# Patient Record
Sex: MALE | Race: WHITE | URBAN - METROPOLITAN AREA
[De-identification: names, ages, dates, MRNs, and addresses within clinical notes are randomized per-mention and may not be internally consistent; named-entity substitution may affect disease eponyms.]

---

## 2017-05-11 ENCOUNTER — EMPLOYEE WELLNESS (OUTPATIENT)
Dept: OTHER | Age: 47
End: 2017-05-11

## 2017-05-11 LAB
CHOLESTEROL, TOTAL: 204 MG/DL (ref 0–199)
GLUCOSE BLD-MCNC: 90 MG/DL (ref 74–107)
HDLC SERPL-MCNC: 44 MG/DL
LDL CHOLESTEROL CALCULATED: 122 MG/DL (ref 0–99)
TRIGL SERPL-MCNC: 192 MG/DL (ref 0–149)

## 2018-03-20 VITALS — WEIGHT: 192 LBS

## 2018-05-18 ENCOUNTER — EMPLOYEE WELLNESS (OUTPATIENT)
Dept: OTHER | Age: 48
End: 2018-05-18

## 2018-05-19 LAB
CHOLESTEROL, TOTAL: 199 MG/DL (ref 0–199)
GLUCOSE BLD-MCNC: 87 MG/DL (ref 74–107)
HDLC SERPL-MCNC: 37 MG/DL
LDL CHOLESTEROL CALCULATED: 112 MG/DL (ref 0–99)
TRIGL SERPL-MCNC: 248 MG/DL (ref 0–149)

## 2018-05-29 VITALS — WEIGHT: 194 LBS

## 2019-04-24 ENCOUNTER — EMPLOYEE WELLNESS (OUTPATIENT)
Dept: OTHER | Age: 49
End: 2019-04-24

## 2019-04-24 LAB
CHOLESTEROL, TOTAL: 193 MG/DL (ref 0–199)
GLUCOSE BLD-MCNC: 89 MG/DL (ref 74–107)
HDLC SERPL-MCNC: 38 MG/DL
LDL CHOLESTEROL CALCULATED: 123 MG/DL (ref 0–99)
TRIGL SERPL-MCNC: 159 MG/DL (ref 0–149)

## 2019-04-29 VITALS — WEIGHT: 193 LBS

## 2020-03-26 ENCOUNTER — HOSPITAL ENCOUNTER (OUTPATIENT)
Age: 50
Discharge: HOME | End: 2020-03-28
Payer: COMMERCIAL

## 2020-03-26 ENCOUNTER — OFFICE VISIT (OUTPATIENT)
Dept: PRIMARY CARE CLINIC | Age: 50
End: 2020-03-26
Payer: COMMERCIAL

## 2020-03-26 VITALS
RESPIRATION RATE: 20 BRPM | SYSTOLIC BLOOD PRESSURE: 136 MMHG | WEIGHT: 190 LBS | OXYGEN SATURATION: 97 % | DIASTOLIC BLOOD PRESSURE: 80 MMHG | HEART RATE: 96 BPM | HEIGHT: 70 IN | TEMPERATURE: 98.6 F | BODY MASS INDEX: 27.2 KG/M2

## 2020-03-26 LAB
INFLUENZA A ANTIBODY: NORMAL
INFLUENZA B ANTIBODY: NORMAL

## 2020-03-26 PROCEDURE — 87804 INFLUENZA ASSAY W/OPTIC: CPT | Performed by: PHYSICIAN ASSISTANT

## 2020-03-26 PROCEDURE — 99213 OFFICE O/P EST LOW 20 MIN: CPT | Performed by: PHYSICIAN ASSISTANT

## 2020-03-26 PROCEDURE — U0002 COVID-19 LAB TEST NON-CDC: HCPCS

## 2020-03-26 NOTE — PROGRESS NOTES
dry.      Findings: No rash. Neurological:      Mental Status: He is alert and oriented to person, place, and time. Deep Tendon Reflexes: Reflexes are normal and symmetric. Psychiatric:         Speech: Speech normal.         Behavior: Behavior normal.              Assessment/Plan:  1. Exposure to 2019 novel coronavirus  - COVID-19; Future    2. Flu-like symptoms  - POCT Influenza A/B           Clearance JUMA Ng  3/26/20     This visit was provided as a focused evaluation during the COVID -19 pandemic/national emergency. A comprehensive review of all previous patient history and testing was not conducted. Pertinent findings were elicited during the visit.

## 2020-03-26 NOTE — PATIENT INSTRUCTIONS
petting, snuggling, being kissed or licked, and sharing food. If you must care for your pet or be around animals while you are sick, wash your hands before and after you interact with pets and wear a facemask. Call ahead before visiting your doctor  If you have a medical appointment, call the healthcare provider and tell them that you have or may have COVID-19. This will help the healthcare providers office take steps to keep other people from getting infected or exposed. Wear a facemask  You should wear a facemask when you are around other people (e.g., sharing a room or vehicle) or pets and before you enter a healthcare providers office. If you are not able to wear a facemask (for example, because it causes trouble breathing), then people who live with you should not stay in the same room with you, or they should wear a facemask if they enter your room. Cover your coughs and sneezes  Cover your mouth and nose with a tissue when you cough or sneeze. Throw used tissues in a lined trash can. Immediately wash your hands with soap and water for at least 20 seconds or, if soap and water are not available, clean your hands with an alcohol-based hand  that contains at least 60% alcohol. Clean your hands often  Wash your hands often with soap and water for at least 20 seconds, especially after blowing your nose, coughing, or sneezing; going to the bathroom; and before eating or preparing food. If soap and water are not readily available, use an alcohol-based hand  with at least 60% alcohol, covering all surfaces of your hands and rubbing them together until they feel dry. Soap and water are the best option if hands are visibly dirty. Avoid touching your eyes, nose, and mouth with unwashed hands. Avoid sharing personal household items  You should not share dishes, drinking glasses, cups, eating utensils, towels, or bedding with other people or pets in your home.  After using these items, they should departments.

## 2020-03-27 ENCOUNTER — TELEPHONE (OUTPATIENT)
Dept: ADMINISTRATIVE | Age: 50
End: 2020-03-27

## 2020-03-30 LAB
REPORT: ABNORMAL
SARS-COV-2: DETECTED
THIS TEST SENT TO: ABNORMAL

## 2020-04-17 ENCOUNTER — HOSPITAL ENCOUNTER (OUTPATIENT)
Age: 50
Discharge: HOME | End: 2020-04-19
Payer: COMMERCIAL

## 2020-04-17 ENCOUNTER — OFFICE VISIT (OUTPATIENT)
Dept: PRIMARY CARE CLINIC | Age: 50
End: 2020-04-17
Payer: COMMERCIAL

## 2020-04-17 VITALS
TEMPERATURE: 98 F | RESPIRATION RATE: 16 BRPM | HEART RATE: 93 BPM | SYSTOLIC BLOOD PRESSURE: 106 MMHG | OXYGEN SATURATION: 98 % | DIASTOLIC BLOOD PRESSURE: 80 MMHG

## 2020-04-17 LAB — SARS-COV-2, NAAT: NOT DETECTED

## 2020-04-17 PROCEDURE — U0002 COVID-19 LAB TEST NON-CDC: HCPCS

## 2020-04-17 PROCEDURE — 99213 OFFICE O/P EST LOW 20 MIN: CPT | Performed by: PHYSICIAN ASSISTANT

## 2020-04-17 NOTE — PROGRESS NOTES
Chief Complaint   Covid Testing (Retest, + 3/26) and Fatigue    History of Present Illness   Source of history provided by: patient. Kwasi Meraz is a 51 y.o. old male who has a past medical history of: History reviewed. No pertinent past medical history. Presents to the flu clinic for recheck after testing positive for COVID-19 on 3/26. He is a [employer] and needs two consecutive negative swabs in order to return to work. Pt reports ongoing fatigue but denies any additional symptoms. Denies any cough, SOB, fever, CP, abdominal pain, vomiting, or rash. Pt did not take any fever reducers prior to arrival. Denies any hx of asthma, COPD, or tobacco use. ROS   Pertinent positives and negatives are stated within HPI, all other systems reviewed and are negative. History reviewed. No pertinent surgical history. Social History:  reports that he has never smoked. He has never used smokeless tobacco.  Family History: family history is not on file. Allergies: Patient has no known allergies. Physical Exam      VS:  /80   Pulse 93   Temp 98 °F (36.7 °C) (Oral)   Resp 16   SpO2 98%    Oxygen Saturation Interpretation: Normal.    Constitutional:  Alert, development consistent with age. NAD. Neck:  Normal ROM. Supple. No anterior cervical adenopathy noted. Lungs: CTAB without wheezes, rales, or rhonchi. CV:  Regular rate and rhythm, normal heart sounds, without pathological murmurs, ectopy, gallops, or rubs. Skin:  Normal turgor. Warm, dry, without visible rash. Lymphatic: No lymphangitis or adenopathy noted. Neurological:  Oriented. Motor functions intact. Lab / Imaging Results   (All laboratory and radiology results have been personally reviewed by myself)  Labs:  No results found for this visit on 04/17/20. Imaging: All Radiology results interpreted by Radiologist unless otherwise noted.   No orders to display       Medical Decision Making   Pt non-toxic and stable at time of

## 2020-04-18 ENCOUNTER — NURSE ONLY (OUTPATIENT)
Dept: PRIMARY CARE CLINIC | Age: 50
End: 2020-04-18

## 2020-04-18 ENCOUNTER — HOSPITAL ENCOUNTER (OUTPATIENT)
Age: 50
Discharge: HOME | End: 2020-04-20
Payer: COMMERCIAL

## 2020-04-18 LAB — SARS-COV-2, NAAT: NOT DETECTED

## 2020-04-18 PROCEDURE — U0002 COVID-19 LAB TEST NON-CDC: HCPCS

## 2021-06-22 LAB
CHOLESTEROL, TOTAL: 195 MG/DL (ref 0–199)
GLUCOSE BLD-MCNC: 89 MG/DL (ref 74–107)
HDLC SERPL-MCNC: 36 MG/DL
LDL CHOLESTEROL CALCULATED: 118 MG/DL (ref 0–99)
TRIGL SERPL-MCNC: 207 MG/DL (ref 0–149)

## 2022-05-11 ENCOUNTER — OFFICE VISIT (OUTPATIENT)
Dept: FAMILY MEDICINE CLINIC | Age: 52
End: 2022-05-11
Payer: COMMERCIAL

## 2022-05-11 VITALS
BODY MASS INDEX: 27.06 KG/M2 | HEART RATE: 76 BPM | OXYGEN SATURATION: 98 % | HEIGHT: 70 IN | WEIGHT: 189 LBS | RESPIRATION RATE: 18 BRPM | SYSTOLIC BLOOD PRESSURE: 142 MMHG | DIASTOLIC BLOOD PRESSURE: 90 MMHG | TEMPERATURE: 98.2 F

## 2022-05-11 DIAGNOSIS — M25.561 ACUTE PAIN OF RIGHT KNEE: ICD-10-CM

## 2022-05-11 DIAGNOSIS — M25.562 ACUTE PAIN OF LEFT KNEE: Primary | ICD-10-CM

## 2022-05-11 PROCEDURE — 99214 OFFICE O/P EST MOD 30 MIN: CPT | Performed by: NURSE PRACTITIONER

## 2022-05-11 RX ORDER — METHYLPREDNISOLONE 4 MG/1
TABLET ORAL
Qty: 1 KIT | Refills: 0 | Status: SHIPPED | OUTPATIENT
Start: 2022-05-11

## 2022-05-11 RX ORDER — FLUVOXAMINE MALEATE 100 MG/1
CAPSULE, EXTENDED RELEASE ORAL
COMMUNITY
Start: 2022-04-25

## 2022-05-11 NOTE — PROGRESS NOTES
Chief Complaint:   Knee Pain (Both)    History of Present Illness   Source of history provided by:  patient. Cristopher Moran is a 49 y.o. old male who presents to the walk-in for bilateral knee pain for the past week. States the pain is located over the anterior and distal aspect and does not radiate. States the pain is progressive. Denies any known injury to the knee, however, reports he started bicycling and jumping rope recently. Reports associated swelling and moderate pain with ROM. Pain is also exacerbated by ambulation. Denies any weakness, paresthesias, calf pain/edema, foot/ankle pain, hip pain, back pain, abrasions, fever, chills, rash, or any other symptoms. There has not been a history or prior knee problems. Denies any history of previous knee surgery. Has been taking Tylenol and Motrin OTC without relief. Review of Systems    Unless otherwise stated in this report or unable to obtain because of the patient's clinical or mental status as evidenced by the medical record, this patients's positive and negative responses for Review of Systems, constitutional, psych, eyes, ENT, cardiovascular, respiratory, gastrointestinal, neurological, genitourinary, musculoskeletal, integument systems and systems related to the presenting problem are either stated in the preceding or were negative for the symptoms and/or complaints related to the medical problem.waldemar. Past Medical History:  has no past medical history on file. Past Surgical History:  has no past surgical history on file. Social History:  reports that he has never smoked. He has never used smokeless tobacco.  Family History: family history is not on file. Allergies: Patient has no known allergies.     Physical Exam   Vital Signs:  BP (!) 142/90 (Site: Left Upper Arm, Position: Sitting, Cuff Size: Medium Adult)   Pulse 76   Temp 98.2 °F (36.8 °C) (Temporal)   Resp 18   Ht 5' 10\" (1.778 m)   Wt 189 lb (85.7 kg)   SpO2 98%   BMI 27.12 kg/m²  Oxygen Saturation Interpretation: Normal.    Constitutional:  Alert, development consistent with age. Lungs: CTAB without wheezing, rales, or rhonchi. CV: RRR without pathologic murmurs or gallops. Knee:  bilateral               Inspection: Bilateral knee without obvious deformity. Tenderness:  Mild TTP over distal anterior. Swelling/Effusion: No edema noted. Deformity: No obvious deformity. ROM: ROM 0º-120º with mild to moderate discomfort. Skin:  No bruising noted. No abrasions, rashes, or erythema noted. Drawer's:  Negativ anterior/posterior drawer sign. Baljeet's: Negative Baljeet's sign. Valgus/Varus Stress: Negative Varus/Valgus stress sign. Crepitus: No crepitus noted with flexion and extension. Hip: bilateral            Tenderness:  No TTP.              ROM: FROM hip without pain. Joint(s) Below: Bilateral calf/ankle/foot                Tenderness:  No TTP over calf, ankle, or foot without any edema. ROM: FROM without pain or deficits. Neurovascular:             Sensory deficit: Sensation intact above and below the injury site. Pulse deficit: Pulses 2+ and bounding. Capillary refill: Less then 2 sec throughout. Gait:  Slightly antalgic gait, but able to bear weight with mild difficulty. Lymphatics: No lymphangitis or adenopathy noted. Neurological:  Alert and oriented. Motor functions intact. Test Results Section   (All laboratory and radiology results have been personally reviewed by myself)  Labs:    Imaging: All Radiology results interpreted by Radiologist unless otherwise noted. No results found. Assessment / Plan   Impression(s):  Suma Gonzáles was seen today for knee pain. Diagnoses and all orders for this visit:    Acute pain of left knee  -     XR KNEE LEFT (MIN 4 VIEWS);  Future  -     methylPREDNISolone (MEDROL DOSEPACK) 4 MG tablet; Take by mouth. Acute pain of right knee  -     XR KNEE RIGHT (MIN 4 VIEWS); Future  -     methylPREDNISolone (MEDROL DOSEPACK) 4 MG tablet; Take by mouth.    x-ray of bilateral knees obtained in office, will advise pt of results once available. In the event of no injury or trauma, I have low suspicion for fracture. In this case im going to treat for suspected inflammatory process with Medrol dosepack. Advised to follow up with PCP in 7-10 days if symptoms persist. ED sooner if symptoms worsen or change. ED immediately with any calf pain/swelling, worsening knee pain, paresthesias, weakness, fever, etc. Pt is in agreement with this care plan. All questions answered. Return if symptoms worsen or fail to improve. Electronically signed by ALICIA Son CNP   DD: 5/11/22    **This report was transcribed using voice recognition software. Every effort was made to ensure accuracy; however, inadvertent computerized transcription errors may be present.